# Patient Record
Sex: MALE | Race: WHITE | NOT HISPANIC OR LATINO | URBAN - METROPOLITAN AREA
[De-identification: names, ages, dates, MRNs, and addresses within clinical notes are randomized per-mention and may not be internally consistent; named-entity substitution may affect disease eponyms.]

---

## 2018-11-21 ENCOUNTER — OFFICE VISIT (OUTPATIENT)
Dept: URGENT CARE | Facility: PHYSICIAN GROUP | Age: 63
End: 2018-11-21
Payer: COMMERCIAL

## 2018-11-21 ENCOUNTER — HOSPITAL ENCOUNTER (OUTPATIENT)
Dept: RADIOLOGY | Facility: MEDICAL CENTER | Age: 63
End: 2018-11-21
Attending: FAMILY MEDICINE
Payer: COMMERCIAL

## 2018-11-21 VITALS
OXYGEN SATURATION: 91 % | HEART RATE: 126 BPM | DIASTOLIC BLOOD PRESSURE: 96 MMHG | WEIGHT: 207 LBS | SYSTOLIC BLOOD PRESSURE: 138 MMHG | RESPIRATION RATE: 20 BRPM | TEMPERATURE: 98.5 F

## 2018-11-21 DIAGNOSIS — R09.02 HYPOXIA: ICD-10-CM

## 2018-11-21 DIAGNOSIS — R93.89 ABNORMAL CHEST X-RAY: ICD-10-CM

## 2018-11-21 DIAGNOSIS — R05.9 COUGH: ICD-10-CM

## 2018-11-21 PROCEDURE — 71046 X-RAY EXAM CHEST 2 VIEWS: CPT

## 2018-11-21 PROCEDURE — 99203 OFFICE O/P NEW LOW 30 MIN: CPT | Performed by: FAMILY MEDICINE

## 2018-11-21 ASSESSMENT — ENCOUNTER SYMPTOMS
COUGH: 1
SPUTUM PRODUCTION: 1
CHILLS: 0
DIZZINESS: 0
FEVER: 0
PALPITATIONS: 0
WHEEZING: 0
SORE THROAT: 0
SWEATS: 0
ORTHOPNEA: 0
HEMOPTYSIS: 0
HEADACHES: 0
EYES NEGATIVE: 1
SHORTNESS OF BREATH: 1

## 2018-11-22 NOTE — PROGRESS NOTES
Subjective:      Ta Gould is a 63 y.o. male who presents with URI (x5 days, congestion, SOB after 20 steps, then recovery quickly )            Cough   This is a new problem. Episode onset: 7+ days of worsening cough. The cough is productive of brown sputum. Associated symptoms include nasal congestion (Patient reports persistent nasal congestion and purulent drainage.  He also reports having had sinus surgery in the past and also frequent sinus infection in the past. ) and shortness of breath (Has noticed getting easily winded over the course of the past few days.  Denies any orthopnea or chest pain per se). Pertinent negatives include no chest pain, chills, ear pain, fever, headaches, hemoptysis, sore throat, sweats or wheezing. Nothing aggravates the symptoms. He has tried nothing for the symptoms. The treatment provided no relief.   He works in a [employer].  Former smoker, quit 9 years ago.  No recent travel out of the [place], no exposure to pneumonia    Review of Systems   Constitutional: Negative for chills and fever.   HENT: Positive for congestion. Negative for ear pain and sore throat.    Eyes: Negative.    Respiratory: Positive for cough, sputum production and shortness of breath (Has noticed getting easily winded over the course of the past few days.  Denies any orthopnea or chest pain per se). Negative for hemoptysis and wheezing.    Cardiovascular: Negative for chest pain, palpitations, orthopnea and leg swelling.   Skin: Negative.    Neurological: Negative for dizziness and headaches.          Objective:     /96   Pulse (!) 126   Temp 36.9 °C (98.5 °F)   Resp 20   Wt 93.9 kg (207 lb)   SpO2 91%      Physical Exam   Constitutional: He is oriented to person, place, and time. He appears well-developed and well-nourished.  Non-toxic appearance. He does not have a sickly appearance. He does not appear ill. No distress.   HENT:   Right Ear: Tympanic membrane, external ear and ear canal  normal.   Left Ear: Tympanic membrane, external ear and ear canal normal.   Nose: Mucosal edema present.   Mouth/Throat: Uvula is midline. No trismus in the jaw. No uvula swelling. No oropharyngeal exudate, posterior oropharyngeal edema, posterior oropharyngeal erythema or tonsillar abscesses. No tonsillar exudate.   Purulent discharge in the near more so on the left   Cardiovascular: Regular rhythm.  Tachycardia present.  Exam reveals no gallop and no friction rub.    No murmur heard.  Pulmonary/Chest: Effort normal. No respiratory distress. He has no wheezes. He has rales.   Poor air entry and decreased breath sounds, lower lobe crackles and some rales   Neurological: He is alert and oriented to person, place, and time.   Skin: Skin is warm. No rash noted. He is not diaphoretic. No pallor.       CXR: Reticular pulmonary opacities are worrisome for interstitial lung disease. Superimposed edema or atypical infection could not be excluded    Bullous/emphysematous disease    Moderate cardiac silhouette enlargement          Assessment/Plan:     ASSESSMENT:PLAN:  1. Cough  - DX-CHEST-2 VIEWS; Future    2. Hypoxia      3. Abnormal chest x-ray    After longer discussion patient agreed to get the x-ray  X-ray discussed and also showed the patient with significant abnormalities in the lung.  He likely has pneumonia but also superimposed edema cannot be ruled out and with the hypoxia (repeat pulse ox was 91% on room air), I recommended further evaluation in the emergency department setting  Patient elected to go to the nearest emergency department [facility] and the emergency department was called and discussed with ED physician

## 2021-03-03 DIAGNOSIS — Z23 NEED FOR VACCINATION: ICD-10-CM
